# Patient Record
Sex: MALE | Race: WHITE | NOT HISPANIC OR LATINO | URBAN - METROPOLITAN AREA
[De-identification: names, ages, dates, MRNs, and addresses within clinical notes are randomized per-mention and may not be internally consistent; named-entity substitution may affect disease eponyms.]

---

## 2020-01-04 ENCOUNTER — EMERGENCY (EMERGENCY)
Facility: HOSPITAL | Age: 45
LOS: 1 days | Discharge: HOME | End: 2020-01-04
Attending: EMERGENCY MEDICINE
Payer: COMMERCIAL

## 2020-01-04 VITALS
DIASTOLIC BLOOD PRESSURE: 80 MMHG | TEMPERATURE: 98 F | RESPIRATION RATE: 16 BRPM | OXYGEN SATURATION: 100 % | HEART RATE: 80 BPM | WEIGHT: 160.06 LBS | HEIGHT: 68 IN | SYSTOLIC BLOOD PRESSURE: 148 MMHG

## 2020-01-04 PROCEDURE — 90471 IMMUNIZATION ADMIN: CPT

## 2020-01-04 PROCEDURE — 99283 EMERGENCY DEPT VISIT LOW MDM: CPT | Mod: 25

## 2020-01-04 PROCEDURE — 99282 EMERGENCY DEPT VISIT SF MDM: CPT

## 2020-01-04 PROCEDURE — 90715 TDAP VACCINE 7 YRS/> IM: CPT

## 2020-01-04 RX ORDER — TETANUS TOXOID, REDUCED DIPHTHERIA TOXOID AND ACELLULAR PERTUSSIS VACCINE, ADSORBED 5; 2.5; 8; 8; 2.5 [IU]/.5ML; [IU]/.5ML; UG/.5ML; UG/.5ML; UG/.5ML
0.5 SUSPENSION INTRAMUSCULAR ONCE
Refills: 0 | Status: COMPLETED | OUTPATIENT
Start: 2020-01-04 | End: 2020-01-04

## 2020-01-04 RX ADMIN — TETANUS TOXOID, REDUCED DIPHTHERIA TOXOID AND ACELLULAR PERTUSSIS VACCINE, ADSORBED 0.5 MILLILITER(S): 5; 2.5; 8; 8; 2.5 SUSPENSION INTRAMUSCULAR at 11:04

## 2020-01-04 NOTE — ED ADULT NURSE NOTE - NSIMPLEMENTINTERV_GEN_ALL_ED
Implemented All Universal Safety Interventions:  [place] to call system. Call bell, personal items and telephone within reach. Instruct patient to call for assistance. Room bathroom lighting operational. Non-slip footwear when patient is off stretcher. Physically safe environment: no spills, clutter or unnecessary equipment. Stretcher in lowest position, wheels locked, appropriate side rails in place.

## 2020-01-04 NOTE — ED PROVIDER NOTE - SKIN, MLM
2 linear lacerations lateral to right eye with 0.5 cm of open wound edges. Minimal ecchymosis or swelling. No josette deformities.

## 2020-01-04 NOTE — ED PROVIDER NOTE - CLINICAL SUMMARY MEDICAL DECISION MAKING FREE TEXT BOX
Patient with laceration, too late for sutures. Head injury with no evidence of intracranial hemorrhage. Advised follow-up with PMD within 2-3 days, and followup with plastic surgeon after 6 months if desired.

## 2020-01-04 NOTE — ED PROVIDER NOTE - OBJECTIVE STATEMENT
44 year old male with no pertinent PMHx or PSHx presents to the ED with complaints of lacerations to his face. Patient reports that yesterday around 19:00 last night, patient tripped on the sidewalk, causing him to hit his head. Patient states that he did not lose consciousness after the fall. Patient reports that he visited an Urgent Care today, where he was recommended to visit the ED for further evaluation. Patient notes that he is unsure as to when he last received his tetanus shot. Patient denies all other acute complaints. Patient mentions he is not currently prescribed any blood thinners. NKDA. 44 year old male with no pertinent PMHx or PSHx presents to the ED with complaints of lacerations to his face. Patient reports that yesterday around 19:00 last night, patient tripped on the sidewalk, causing him to hit his head. Patient states that he did not lose consciousness after the fall. Patient reports that he visited an Urgent Care today, where he was recommended to visit the ED for further evaluation. Patient notes that he is unsure as to when he last received his tetanus shot. Patient syncope, ha, cp, sob, ap, n/v/d, focal weakness, paresthesias. Patient mentions he is not currently prescribed any blood thinners. NKDA.

## 2020-01-04 NOTE — ED PROVIDER NOTE - PATIENT PORTAL LINK FT
You can access the FollowMyHealth Patient Portal offered by [facility] by registering at the following website: [email or web address]. By joining [employer]’s FollowMyHealth portal, you will also be able to view your health information using other applications (apps) compatible with our system.

## 2022-08-22 NOTE — ED PROVIDER NOTE - CARDIAC, MLM
Can try apap 10-15 cwp and see if it makes a difference. Will need compliance in 6 weeks.      Normal rate, regular rhythm.  Heart sounds S1, S2.  No murmurs, rubs or gallops.